# Patient Record
Sex: FEMALE | Race: WHITE | Employment: UNEMPLOYED | URBAN - METROPOLITAN AREA
[De-identification: names, ages, dates, MRNs, and addresses within clinical notes are randomized per-mention and may not be internally consistent; named-entity substitution may affect disease eponyms.]

---

## 2017-01-06 ENCOUNTER — HISTORICAL (OUTPATIENT)
Dept: ADMINISTRATIVE | Facility: HOSPITAL | Age: 27
End: 2017-01-06

## 2017-05-08 ENCOUNTER — HISTORICAL (OUTPATIENT)
Dept: ADMINISTRATIVE | Facility: HOSPITAL | Age: 27
End: 2017-05-08

## 2017-05-08 LAB
ALBUMIN SERPL-MCNC: 4.2 GM/DL (ref 3.4–5)
ALBUMIN/GLOB SERPL: 1 RATIO (ref 1–2)
ALP SERPL-CCNC: 61 UNIT/L (ref 20–120)
ALT SERPL-CCNC: 108 UNIT/L
AST SERPL-CCNC: 62 UNIT/L
BILIRUB SERPL-MCNC: 0.4 MG/DL
BILIRUBIN DIRECT+TOT PNL SERPL-MCNC: <0.1 MG/DL
BILIRUBIN DIRECT+TOT PNL SERPL-MCNC: >0.3 MG/DL
BUN SERPL-MCNC: 7 MG/DL (ref 7–25)
CALCIUM SERPL-MCNC: 9.5 MG/DL (ref 8.4–10.3)
CHLORIDE SERPL-SCNC: 104 MMOL/L (ref 96–110)
CO2 SERPL-SCNC: 27 MMOL/L (ref 24–32)
CREAT SERPL-MCNC: 0.75 MG/DL (ref 0.7–1.1)
DEPRECATED CALCIDIOL+CALCIFEROL SERPL-MC: 25.4 NG/ML (ref 30–80)
GLOBULIN SER-MCNC: 3.4 GM/ML (ref 2.3–3.5)
GLUCOSE SERPL-MCNC: 99 MG/DL (ref 65–99)
POTASSIUM SERPL-SCNC: 4.2 MMOL/L (ref 3.6–5.2)
PROT SERPL-MCNC: 7.6 GM/DL (ref 6–8)
SODIUM SERPL-SCNC: 139 MMOL/L (ref 135–146)

## 2017-11-08 ENCOUNTER — HISTORICAL (OUTPATIENT)
Dept: INTERNAL MEDICINE | Facility: CLINIC | Age: 27
End: 2017-11-08

## 2017-11-08 LAB
ABS NEUT (OLG): 7.2 X10(3)/MCL (ref 2.1–9.2)
ALBUMIN SERPL-MCNC: 4.2 GM/DL (ref 3.4–5)
ALBUMIN/GLOB SERPL: 1 RATIO (ref 1–2)
ALP SERPL-CCNC: 80 UNIT/L (ref 45–117)
ALT SERPL-CCNC: 84 UNIT/L (ref 12–78)
APPEARANCE, UA: CLEAR
AST SERPL-CCNC: 48 UNIT/L (ref 15–37)
BACTERIA #/AREA URNS AUTO: ABNORMAL /[HPF]
BASOPHILS # BLD AUTO: 0.09 X10(3)/MCL
BASOPHILS NFR BLD AUTO: 1 % (ref 0–1)
BILIRUB SERPL-MCNC: 0.6 MG/DL (ref 0.2–1)
BILIRUB UR QL STRIP: NEGATIVE
BILIRUBIN DIRECT+TOT PNL SERPL-MCNC: 0.2 MG/DL
BILIRUBIN DIRECT+TOT PNL SERPL-MCNC: 0.4 MG/DL
BUN SERPL-MCNC: 9 MG/DL (ref 7–18)
CALCIUM SERPL-MCNC: 9.9 MG/DL (ref 8.5–10.1)
CHLORIDE SERPL-SCNC: 103 MMOL/L (ref 98–107)
CO2 SERPL-SCNC: 29 MMOL/L (ref 21–32)
COLOR UR: ABNORMAL
CREAT SERPL-MCNC: 0.9 MG/DL (ref 0.6–1.3)
DEPRECATED CALCIDIOL+CALCIFEROL SERPL-MC: 28.63 NG/ML (ref 30–80)
EOSINOPHIL # BLD AUTO: 0.33 10*3/UL
EOSINOPHIL NFR BLD AUTO: 3 % (ref 0–5)
ERYTHROCYTE [DISTWIDTH] IN BLOOD BY AUTOMATED COUNT: 11.9 % (ref 11.5–14.5)
GLOBULIN SER-MCNC: 4.2 GM/ML (ref 2.3–3.5)
GLUCOSE (UA): NORMAL
GLUCOSE SERPL-MCNC: 84 MG/DL (ref 74–106)
HAV IGM SERPL QL IA: NONREACTIVE
HBV CORE IGM SERPL QL IA: NONREACTIVE
HBV SURFACE AG SERPL QL IA: NEGATIVE
HCT VFR BLD AUTO: 41.2 % (ref 35–46)
HCV AB SERPL QL IA: NONREACTIVE
HGB BLD-MCNC: 13.7 GM/DL (ref 12–16)
HGB UR QL STRIP: NEGATIVE
HIV 1+2 AB+HIV1 P24 AG SERPL QL IA: NONREACTIVE
HYALINE CASTS #/AREA URNS LPF: ABNORMAL /[LPF]
IMM GRANULOCYTES # BLD AUTO: 0.05 10*3/UL
IMM GRANULOCYTES NFR BLD AUTO: 0 %
KETONES UR QL STRIP: NEGATIVE
LEUKOCYTE ESTERASE UR QL STRIP: NEGATIVE
LYMPHOCYTES # BLD AUTO: 2.75 X10(3)/MCL
LYMPHOCYTES NFR BLD AUTO: 25 % (ref 15–40)
MCH RBC QN AUTO: 30.4 PG (ref 26–34)
MCHC RBC AUTO-ENTMCNC: 33.3 GM/DL (ref 31–37)
MCV RBC AUTO: 91.6 FL (ref 80–100)
MONOCYTES # BLD AUTO: 0.76 X10(3)/MCL
MONOCYTES NFR BLD AUTO: 7 % (ref 4–12)
NEUTROPHILS # BLD AUTO: 7.2 X10(3)/MCL
NEUTROPHILS NFR BLD AUTO: 64 X10(3)/MCL
NITRITE UR QL STRIP: NEGATIVE
PH UR STRIP: 5.5 [PH] (ref 4.5–8)
PLATELET # BLD AUTO: 309 X10(3)/MCL (ref 130–400)
PMV BLD AUTO: 9.4 FL (ref 7.4–10.4)
POTASSIUM SERPL-SCNC: 4.3 MMOL/L (ref 3.5–5.1)
PROT SERPL-MCNC: 8.4 GM/DL (ref 6.4–8.2)
PROT UR QL STRIP: NEGATIVE
RBC # BLD AUTO: 4.5 X10(6)/MCL (ref 4–5.2)
RBC #/AREA URNS AUTO: ABNORMAL /[HPF]
SODIUM SERPL-SCNC: 139 MMOL/L (ref 136–145)
SP GR UR STRIP: 1 (ref 1–1.03)
SQUAMOUS #/AREA URNS LPF: ABNORMAL /[LPF]
T4 FREE SERPL-MCNC: 1.07 NG/DL (ref 0.76–1.46)
TSH SERPL-ACNC: 1.09 MIU/L (ref 0.36–3.74)
UROBILINOGEN UR STRIP-ACNC: NORMAL
WBC # SPEC AUTO: 11.2 X10(3)/MCL (ref 4.5–11)
WBC #/AREA URNS AUTO: ABNORMAL /HPF

## 2018-05-09 ENCOUNTER — HISTORICAL (OUTPATIENT)
Dept: INTERNAL MEDICINE | Facility: CLINIC | Age: 28
End: 2018-05-09

## 2018-05-09 LAB
DEPRECATED CALCIDIOL+CALCIFEROL SERPL-MC: 24.52 NG/ML (ref 30–80)
FERRITIN SERPL-MCNC: 137.8 NG/ML (ref 10–150)
IRON SATN MFR SERPL: 31.5 % (ref 15–50)
IRON SERPL-MCNC: 104 MCG/DL (ref 50–170)
TIBC SERPL-MCNC: 330 MCG/DL (ref 250–450)
TRANSFERRIN SERPL-MCNC: 290 MG/DL (ref 200–360)

## 2018-07-09 ENCOUNTER — HISTORICAL (OUTPATIENT)
Dept: INTERNAL MEDICINE | Facility: CLINIC | Age: 28
End: 2018-07-09

## 2018-08-09 ENCOUNTER — HISTORICAL (OUTPATIENT)
Dept: ADMINISTRATIVE | Facility: HOSPITAL | Age: 28
End: 2018-08-09

## 2018-08-09 LAB
ABS NEUT (OLG): 4.45 X10(3)/MCL (ref 2.1–9.2)
ALBUMIN SERPL-MCNC: 4 GM/DL (ref 3.4–5)
ALBUMIN/GLOB SERPL: 1 RATIO (ref 1–2)
ALP SERPL-CCNC: 75 UNIT/L (ref 45–117)
ALT SERPL-CCNC: 122 UNIT/L (ref 12–78)
AST SERPL-CCNC: 62 UNIT/L (ref 15–37)
BASOPHILS # BLD AUTO: 0.07 X10(3)/MCL
BASOPHILS NFR BLD AUTO: 1 %
BILIRUB SERPL-MCNC: 0.4 MG/DL (ref 0.2–1)
BILIRUBIN DIRECT+TOT PNL SERPL-MCNC: 0.2 MG/DL
BILIRUBIN DIRECT+TOT PNL SERPL-MCNC: 0.2 MG/DL
BUN SERPL-MCNC: 9 MG/DL (ref 7–18)
CALCIUM SERPL-MCNC: 9.2 MG/DL (ref 8.5–10.1)
CHLORIDE SERPL-SCNC: 106 MMOL/L (ref 98–107)
CO2 SERPL-SCNC: 26 MMOL/L (ref 21–32)
CREAT SERPL-MCNC: 0.8 MG/DL (ref 0.6–1.3)
DEPRECATED CALCIDIOL+CALCIFEROL SERPL-MC: 21.25 NG/ML (ref 30–80)
EOSINOPHIL # BLD AUTO: 0.26 X10(3)/MCL
EOSINOPHIL NFR BLD AUTO: 3 %
ERYTHROCYTE [DISTWIDTH] IN BLOOD BY AUTOMATED COUNT: 12.1 % (ref 11.5–14.5)
EST. AVERAGE GLUCOSE BLD GHB EST-MCNC: 94 MG/DL
GLOBULIN SER-MCNC: 4.1 GM/ML (ref 2.3–3.5)
GLUCOSE SERPL-MCNC: 81 MG/DL (ref 74–106)
HBA1C MFR BLD: 4.9 % (ref 4.2–6.3)
HCT VFR BLD AUTO: 40.1 % (ref 35–46)
HGB BLD-MCNC: 13.3 GM/DL (ref 12–16)
IMM GRANULOCYTES # BLD AUTO: 0.03 10*3/UL
IMM GRANULOCYTES NFR BLD AUTO: 0 %
LYMPHOCYTES # BLD AUTO: 2.6 X10(3)/MCL
LYMPHOCYTES NFR BLD AUTO: 32 % (ref 13–40)
MCH RBC QN AUTO: 31.4 PG (ref 26–34)
MCHC RBC AUTO-ENTMCNC: 33.2 GM/DL (ref 31–37)
MCV RBC AUTO: 94.6 FL (ref 80–100)
MONOCYTES # BLD AUTO: 0.63 X10(3)/MCL
MONOCYTES NFR BLD AUTO: 8 % (ref 4–12)
NEUTROPHILS # BLD AUTO: 4.45 X10(3)/MCL
NEUTROPHILS NFR BLD AUTO: 55 X10(3)/MCL
PLATELET # BLD AUTO: 347 X10(3)/MCL (ref 130–400)
PMV BLD AUTO: 9.5 FL (ref 7.4–10.4)
POTASSIUM SERPL-SCNC: 4.4 MMOL/L (ref 3.5–5.1)
PROT SERPL-MCNC: 8.1 GM/DL (ref 6.4–8.2)
RBC # BLD AUTO: 4.24 X10(6)/MCL (ref 4–5.2)
SODIUM SERPL-SCNC: 139 MMOL/L (ref 136–145)
WBC # SPEC AUTO: 8 X10(3)/MCL (ref 4.5–11)

## 2018-08-22 ENCOUNTER — HISTORICAL (OUTPATIENT)
Dept: RADIOLOGY | Facility: HOSPITAL | Age: 28
End: 2018-08-22

## 2018-12-14 ENCOUNTER — HISTORICAL (OUTPATIENT)
Dept: INTERNAL MEDICINE | Facility: CLINIC | Age: 28
End: 2018-12-14

## 2018-12-14 LAB
ABS NEUT (OLG): 4.91 X10(3)/MCL (ref 2.1–9.2)
ALBUMIN SERPL-MCNC: 4.2 GM/DL (ref 3.4–5)
ALBUMIN/GLOB SERPL: 1 RATIO (ref 1–2)
ALP SERPL-CCNC: 69 UNIT/L (ref 45–117)
ALT SERPL-CCNC: 97 UNIT/L (ref 12–78)
AST SERPL-CCNC: 45 UNIT/L (ref 15–37)
BASOPHILS # BLD AUTO: 0.06 X10(3)/MCL
BASOPHILS NFR BLD AUTO: 1 %
BILIRUB SERPL-MCNC: 0.5 MG/DL (ref 0.2–1)
BILIRUBIN DIRECT+TOT PNL SERPL-MCNC: 0.2 MG/DL
BILIRUBIN DIRECT+TOT PNL SERPL-MCNC: 0.3 MG/DL
BUN SERPL-MCNC: 12 MG/DL (ref 7–18)
CALCIUM SERPL-MCNC: 9.3 MG/DL (ref 8.5–10.1)
CHLORIDE SERPL-SCNC: 105 MMOL/L (ref 98–107)
CO2 SERPL-SCNC: 27 MMOL/L (ref 21–32)
CREAT SERPL-MCNC: 0.8 MG/DL (ref 0.6–1.3)
DEPRECATED CALCIDIOL+CALCIFEROL SERPL-MC: 20.97 NG/ML (ref 30–80)
EOSINOPHIL # BLD AUTO: 0.14 X10(3)/MCL
EOSINOPHIL NFR BLD AUTO: 2 %
ERYTHROCYTE [DISTWIDTH] IN BLOOD BY AUTOMATED COUNT: 11.6 % (ref 11.5–14.5)
GLOBULIN SER-MCNC: 3.9 GM/ML (ref 2.3–3.5)
GLUCOSE SERPL-MCNC: 91 MG/DL (ref 74–106)
HCT VFR BLD AUTO: 36.7 % (ref 35–46)
HGB BLD-MCNC: 12.4 GM/DL (ref 12–16)
IMM GRANULOCYTES # BLD AUTO: 0.03 10*3/UL
IMM GRANULOCYTES NFR BLD AUTO: 0 %
LYMPHOCYTES # BLD AUTO: 2.41 X10(3)/MCL
LYMPHOCYTES NFR BLD AUTO: 30 % (ref 13–40)
MCH RBC QN AUTO: 31.9 PG (ref 26–34)
MCHC RBC AUTO-ENTMCNC: 33.8 GM/DL (ref 31–37)
MCV RBC AUTO: 94.3 FL (ref 80–100)
MONOCYTES # BLD AUTO: 0.59 X10(3)/MCL
MONOCYTES NFR BLD AUTO: 7 % (ref 4–12)
NEUTROPHILS # BLD AUTO: 4.91 X10(3)/MCL
NEUTROPHILS NFR BLD AUTO: 60 X10(3)/MCL
PLATELET # BLD AUTO: 311 X10(3)/MCL (ref 130–400)
PMV BLD AUTO: 8.8 FL (ref 7.4–10.4)
POTASSIUM SERPL-SCNC: 4.1 MMOL/L (ref 3.5–5.1)
PROT SERPL-MCNC: 8.1 GM/DL (ref 6.4–8.2)
RBC # BLD AUTO: 3.89 X10(6)/MCL (ref 4–5.2)
SODIUM SERPL-SCNC: 139 MMOL/L (ref 136–145)
WBC # SPEC AUTO: 8.1 X10(3)/MCL (ref 4.5–11)

## 2019-03-19 ENCOUNTER — HISTORICAL (OUTPATIENT)
Dept: INTERNAL MEDICINE | Facility: CLINIC | Age: 29
End: 2019-03-19

## 2019-03-19 LAB
ABS NEUT (OLG): 4.06 X10(3)/MCL (ref 2.1–9.2)
ALBUMIN SERPL-MCNC: 3.6 GM/DL (ref 3.4–5)
ALBUMIN/GLOB SERPL: 0.8 RATIO (ref 1.1–2)
ALP SERPL-CCNC: 82 UNIT/L (ref 45–117)
ALT SERPL-CCNC: 126 UNIT/L (ref 12–78)
AST SERPL-CCNC: 81 UNIT/L (ref 15–37)
BASOPHILS # BLD AUTO: 0.06 X10(3)/MCL
BASOPHILS NFR BLD AUTO: 1 %
BILIRUB SERPL-MCNC: 0.5 MG/DL (ref 0.2–1)
BILIRUBIN DIRECT+TOT PNL SERPL-MCNC: 0.2 MG/DL
BILIRUBIN DIRECT+TOT PNL SERPL-MCNC: 0.3 MG/DL
BUN SERPL-MCNC: 9 MG/DL (ref 7–18)
CALCIUM SERPL-MCNC: 9.2 MG/DL (ref 8.5–10.1)
CHLORIDE SERPL-SCNC: 106 MMOL/L (ref 98–107)
CO2 SERPL-SCNC: 28 MMOL/L (ref 21–32)
CREAT SERPL-MCNC: 0.8 MG/DL (ref 0.6–1.3)
DEPRECATED CALCIDIOL+CALCIFEROL SERPL-MC: 16.74 NG/ML (ref 30–80)
EOSINOPHIL # BLD AUTO: 0.3 10*3/UL
EOSINOPHIL NFR BLD AUTO: 4 %
ERYTHROCYTE [DISTWIDTH] IN BLOOD BY AUTOMATED COUNT: 11.8 % (ref 11.5–14.5)
GLOBULIN SER-MCNC: 4.3 GM/ML (ref 2.3–3.5)
GLUCOSE SERPL-MCNC: 121 MG/DL (ref 74–106)
HCT VFR BLD AUTO: 40.9 % (ref 35–46)
HGB BLD-MCNC: 13.4 GM/DL (ref 12–16)
IMM GRANULOCYTES # BLD AUTO: 0.02 10*3/UL
IMM GRANULOCYTES NFR BLD AUTO: 0 %
LYMPHOCYTES # BLD AUTO: 1.78 X10(3)/MCL
LYMPHOCYTES NFR BLD AUTO: 27 % (ref 13–40)
MCH RBC QN AUTO: 30.3 PG (ref 26–34)
MCHC RBC AUTO-ENTMCNC: 32.8 GM/DL (ref 31–37)
MCV RBC AUTO: 92.5 FL (ref 80–100)
MONOCYTES # BLD AUTO: 0.43 X10(3)/MCL
MONOCYTES NFR BLD AUTO: 6 % (ref 4–12)
NEUTROPHILS # BLD AUTO: 4.06 X10(3)/MCL
NEUTROPHILS NFR BLD AUTO: 61 X10(3)/MCL
PLATELET # BLD AUTO: 324 X10(3)/MCL (ref 130–400)
PMV BLD AUTO: 9.1 FL (ref 7.4–10.4)
POTASSIUM SERPL-SCNC: 3.9 MMOL/L (ref 3.5–5.1)
PROT SERPL-MCNC: 7.9 GM/DL (ref 6.4–8.2)
RBC # BLD AUTO: 4.42 X10(6)/MCL (ref 4–5.2)
SODIUM SERPL-SCNC: 140 MMOL/L (ref 136–145)
WBC # SPEC AUTO: 6.6 X10(3)/MCL (ref 4.5–11)

## 2021-09-22 ENCOUNTER — HISTORICAL (OUTPATIENT)
Dept: ADMINISTRATIVE | Facility: HOSPITAL | Age: 31
End: 2021-09-22

## 2021-09-22 LAB
BILIRUB SERPL-MCNC: NEGATIVE MG/DL
BLOOD URINE, POC: NEGATIVE
CLARITY, POC UA: CLEAR
COLOR, POC UA: YELLOW
GLUCOSE UR QL STRIP: NEGATIVE
KETONES UR QL STRIP: NEGATIVE
LEUKOCYTE EST, POC UA: NEGATIVE
NITRITE, POC UA: NEGATIVE
PH, POC UA: 7
PROTEIN, POC: NEGATIVE
SPECIFIC GRAVITY, POC UA: 1.01
UROBILINOGEN, POC UA: NORMAL

## 2022-04-10 ENCOUNTER — HISTORICAL (OUTPATIENT)
Dept: ADMINISTRATIVE | Facility: HOSPITAL | Age: 32
End: 2022-04-10

## 2022-04-29 VITALS
HEIGHT: 61 IN | SYSTOLIC BLOOD PRESSURE: 127 MMHG | DIASTOLIC BLOOD PRESSURE: 84 MMHG | WEIGHT: 215.81 LBS | HEIGHT: 61 IN | BODY MASS INDEX: 40.75 KG/M2 | DIASTOLIC BLOOD PRESSURE: 86 MMHG | OXYGEN SATURATION: 99 % | BODY MASS INDEX: 39.41 KG/M2 | OXYGEN SATURATION: 98 % | SYSTOLIC BLOOD PRESSURE: 123 MMHG | WEIGHT: 208.75 LBS

## 2022-05-02 NOTE — HISTORICAL [facility] C[facility]ER
This is a historical note converted from [place]. Formatting and pictures may have been removed.  Please reference C[facility]ain for original formatting and attached multimedia. Chief Complaint  vomitting since yesterday  History of Present Illness  Patient presents to Urgent Care for stated complaint during Covid health crisis.  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 States she will location?in 2013?and 2 .??Took Tylenol?to 500 mg this morning around 4:00am?today?and did help with the pain?.denies any blood in the urine, denies any recent fall or trauma?denies any chest pain or short of breath  ?  Review of Systems  Constitutional: negative except as stated in HPI  Eye: negative except as stated in HPI  ENT: negative except as stated in HPI  Respiratory: negative except as stated in HPI  Cardiovascular: negative except as stated in HPI  Gastrointestinal: negative except as stated in HPI  Genitourinary: negative except as stated in HPI  Hema/Lymph: negative except as stated in HPI  Endocrine: negative except as stated in HPI  Immunologic: negative except as stated in HPI  Musculoskeletal: negative except as stated in HPI  Integumentary: negative except as stated in HPI  Neurologic: negative except as stated in HPI  Physical Exam  Vitals & Measurements  HR:?85(Peripheral)? BP:?127/86? SpO2:?98%?  HT:?154.00?cm? WT:?94.700?kg? BMI:?39.93?  General: well-developed well-nourished in no acute distress  Eye: PERRL, EOMI, clear conjunctiva, eyelids normal  Neck: full range of motion  Respiratory: clear to auscultation bilaterally  Cardiovascular: regular rate and rhythm without murmurs,?radial pulses 2+, cap refill less than 2 seconds  Gastrointestinal: soft, non-tender,  non-distended  Musculoskeletal: SOTO, ambulatory,?no CVA tenderness,?spinal vertebral tenderness?at lumbar?region?nonradiating?no paraspinal?muscle skeletal?tenderness with palpation  Integumentary: no rashes or skin lesions visible  Neurologic: cranial nerves grossly intact, no signs of peripheral neurological deficit  Assessment/Plan  1.?Nausea & vomiting?R11.2  Discussed physical exam findings and test results with patient x-ray showed loss of height?of L5?and S1,?urine negative?for hematuria?or leukocyte?or nitrite, discussed medication prescribed and its use?Rx?Zofran 8 mg ODT?take as directed, diclofenac 50 mg 3 times daily take as directed,?Toradol?60 mg IM?and Zofran 4 mg ODT in the clinic.  Instructed patient to notify his primary care provider regarding the visit today and schedule follow-up appointment in 2 to 3 days if needed  Instructed patient to come back to clinic or go to emergency room department if symptom worsens or no improvement or for any other reasons  Questions elicited and answered  Patient verbalized understanding of discharge instructions, verbalized understand medication prescribed and its use, will read discharge education instructions  Ordered:  ondansetron, 8 mg = 1 tab(s), Oral, TID, PRN PRN nausea/vomiting, allow tablet to dissolve on tongue, # 12 tab(s), 0 Refill(s), Pharmacy: [facility] #[id number], Patient Education Provided, Patient Verbalizes Understanding, 154, cm, Height/Length Dosing, 09/2...  Office/Outpatient Visit Level 4 Established 36067 , Nausea & vomiting  Back pain, 09/22/21 12:15:00 CDT  ?  2.?Back pain?M54.9  ?As discussed in #1, follow-up with PCP.  Ordered:  diclofenac, 50 mg = 1 tab(s), Oral, TID, PRN PRN as needed for pain, with food, do not mix with any other NSAIDs, # 21 tab(s), 0 Refill(s), Pharmacy: [facility] #[id number], Patient Education Provided, Patient Verbalizes Understanding, 154, cm, Height/Length...  ondansetron, 8 mg = 1 tab(s), Oral,  TID, PRN PRN nausea/vomiting, allow tablet to dissolve on tongue, # 12 tab(s), 0 Refill(s), [id number], Patient Education Provided, Patient Verbalizes Understanding, 154, cm, Height/Length Dosing, ...  Office/Outpatient Visit Level 4 Established [id number] PC, Nausea & vomiting  Back pain, 21 12:15:00 CDT  ?   Problem List/Past Medical History  Ongoing  Depression  Depression  Fatty liver  Hyperlipidemia  Obesity  Sinusitis  Tachycardia  Historical  No qualifying data  Procedure/Surgical History  Fracture dislocation of finger (2017)   delivery  tubal ligation   Medications  diclofenac sodium 50 mg oral delayed release tablet, 50 mg= 1 tab(s), Oral, TID, PRN  Imitrex 25 mg oral tablet, 25 mg= 1 tab(s), Oral, Daily, PRN, 1 refills,? ?Not taking  Template Non-Formulary Med  Tylenol with Codeine #3 oral tablet, 1 tab(s), Oral, q6hr, PRN,? ?Not Taking, Completed Rx  Vitamin C 25 mg oral tablet, chewable, 25 mg= 1 tab(s), Chewed, Daily  Vitamin D3 2000 intl units oral capsule, 2000 IntUnit= 1 cap(s), Oral, Daily, 2 refills  Zofran ODT 8 mg oral tablet, disintegrating, 8 mg= 1 tab(s), Oral, TID, PRN  Allergies  Alcohol?(Hives, itching)  Social History  Abuse/Neglect  No, No, Yes, 2021  Alcohol  Current, 1-2 times per week, 2020  Employment/School  Unemployed, 2016  Exercise  Exercise duration: 60. Exercise frequency: 3-4 times/week. Self assessment: Fair condition. Exercise type: Walking., 10/29/2018  Home/Environment  Lives with Children, Mother, Spouse. Living situation: Home/Independent. Home equipment: Walker/Cane. Alcohol abuse in household: No. Substance abuse in household: No. Smoker in household: No. Injuries/Abuse/Neglect in household: No. Feels unsafe at home: No. Safe place to go: Yes. Agency(s)/Others notified: No. Family/Friends available for support: Yes. Concern for family members at home: No. Major illness in household: No. Financial  concerns: Yes. TV/Computer concerns: No. Risks in environment: Pets/Animal exposure., 09/09/2016  Nutrition/Health  Type of diet: low sodium., 10/29/2018  Sexual  Substance Use  Past, Marijuana, 06/07/2017  Tobacco  Former smoker, quit more than 30 days ago, No, 09/22/2021  Family History  Cancer: Father.  Depression.....: Father.  Hypertension.: Father.  Health Maintenance  Health Maintenance  ???Pending?(in the next year)  ??? ??OverDue  ??? ? ? ?Cervical Cancer Screening due??07/17/20??and every 3??year(s)  ??? ? ? ?Alcohol Misuse Screening due??01/02/21??and every 1??year(s)  ??? ? ? ?Tetanus Vaccine due??04/27/21??and every 10??year(s)  ??? ? ? ?ADL Screening due??06/29/21??and every 1??year(s)  ??? ??Due In Future?  ??? ? ? ?Obesity Screening not due until??01/01/22??and every 1??year(s)  ??? ? ? ?Diabetes Screening not due until??03/18/22??and every 3??year(s)  ???Satisfied?(in the past 1 year)  ??? ??Satisfied?  ??? ? ? ?Blood Pressure Screening on??09/22/21.??Satisfied by Brasseaarjun BOYD, Landy D.  ??? ? ? ?Body Mass Index Check on??09/22/21.??Satisfied by Brasseaux SUMITN, Landy D.  ??? ? ? ?Depression Screening on??09/22/21.??Satisfied by Brasseaux SUMITN, Landy D.  ??? ? ? ?Hypertension Management-Blood Pressure on??09/22/21.??Satisfied by Brasseaux SUMITN, Landy D.  ??? ? ? ?Influenza Vaccine on??09/22/21.??Satisfied by Brasseaux LPN, Alndy D.  ??? ? ? ?Obesity Screening on??09/22/21.??Satisfied by Brasseaux SUMITN, Landy D.  ?  Lab Results  Urinalysis????????????  Color Ur (Ref range=Yellow)????????Yellow????  Appear Ur (Ref range=Clear)????????Clear????  pH Ur (Ref range=5-9)????????7????  Spec Grav Ur (Ref range=1.000-1.020)????????1.015????  Blood Ur (Ref range=Negaive)????????Negative????  Glucose Ur (Ref range=Negative)????????Negative????  Ketones Ur (Ref range=Negative)????????Negative????  Protein Ur (Ref range=Negative)????????Negative????  Bilirubin Ur (Ref  range=Negative)????????Negative????  Urobilinogen (Ref range=0.1-2 BU/)????????0.2 mg/dl????  Leukocytes Ur (Ref range=Negative)????????Negative????  Nitrite Ur (Ref range=Negative)????????Negative????  ?  Diagnostic Results  (09/22/2021 11:49 CDT XR Spine Lumbar 2 or 3 Views)  ?  Reason For Exam  pain  ?  Radiology Report  Lumbar spine 3 views  ?  Clinical history is pain  ?  There are degenerative changes at L5-S1  ?  No fractures are seen the alignment is within normal limits. The other  intervertebral disc space are maintained.  ?  IMPRESSION: Loss of height of the disc space at L5-S1.  ?  Signature Line  Electronically Signed By: Deejay Quiles MD  Date/Time Signed: 09/22/2021 11:56 [1]     [1]?XR Spine Lumbar 2 or 3 Views; Deejay Quiles MD 09/22/2021 11:49 CDT

## 2022-05-02 NOTE — HISTORICAL [facility] C[facility]
This is a historical note converted from [facility]. Formatting and pictures may have been removed.  Please reference [facility] for original formatting and attached multimedia. Chief Complaint  annual  History of Present Illness  Pt is  here for annual gyn exam. Denies hx of abnormal pap. Last pap 18. Periods last 5-6 days and changes pads every 2-3 hours. States periods have always been irregular and has period every 1-3 months. Denies heavy bleeding or vaginal discharge. Pt had TL for contraception.?Denies history of abnormal paps. Last pap 2017-NIL. Denies breast or urinary complaints today. States 1 month ago she thinks she had odor when she would urinate states smelled like cherrios.?Denies any odor, frequency, urgency or dysuria today.?Pt has A1C ordered for today. Denies abnormal bleeding but c/o slight discharge when asked during exam. Pt reports no STIs in the past and no concerns. Contraception consist of TL. Denies tobacco and ETOH use. Dep. screening 0. Denies fly hx of ovarian, uterine cancer. Admits to maternal cousin with breast cancer and colon cancer in maternal great uncle.  ?  Review of Systems  CONSTITUTIONAL:??No weight loss, fever, chills, weakness or fatigue.  BREAST: No lumps or masses or pain, no nipple discharge or inversion  GI:??No nausea, vomiting or?abdominal pain.  :??No dysuria, frequency or urgency.?  GYN: No abnormal discharge, itching, bleeding, pelvic pain.  NEUROLOGIC:??No dizziness or confusion.  Physical Exam  Vitals & Measurements  T:?36.7? ?C (Oral)? HR:?87(Peripheral)? RR:?18? BP:?105/72?  HT:?152?cm? HT:?152?cm? WT:?95.1?kg? WT:?95.1?kg? BMI:?41.16?  GENERAL: Alert and oriented x3.?No apparent distress.  BREAST: No mass, tenderness or discharge.?  ABDOMEN: Soft & obese, nontender.??  PELVIC:?  Labia: No erythema or lesions.  Vagina: pink,?white discharge.  Cervix: Pink, no cervical motion tenderness, no lesions.  Uterus: Mobile, non-tender.  Adnexa: Non-tender, no  fullness.  INTEGUMENTARY:?Warm and dry.  NEUROLOGIC: She is alert and oriented x3.?  PSYCHIATRIC:?Cooperative, appropriate mood and affect.  Assessment/Plan  1.?Encounter for routine gynecological examination  ? Pelvic exam today. Pap utd per ACOG.  RTC 1 year.  Ordered:  Clinic Follow up, *Est. 19 3:00:00 CDT, 1 Year, Order for future visit, Encounter for routine gynecological examination, [facility]  Preventative Health Care Est 18-39 years [id number] PC, Encounter for routine gynecological examination, [facility], 18 8:46:00 CDT  ?  2.?Pelvic pain  ? c/o?ovulatory pain/cramping.?Take Tylenol and relieves.?Pt with elevated liver enzymes. Instructed to use Ibuprofen for pain as?needed.  Pelvic uls.  Ordered:  US Pelvic Non-Obstetrics, Routine, 18 8:46:00 CDT, Pelvic Pain, None, Ambulatory, Rad Type, Order for future visit, Pelvic pain, Schedule this test, [facility], 18 8:46:00 CDT  US Transvaginal Non-OB, Routine, 18 8:46:00 CDT, Pelvic Pain, None, Ambulatory, Rad Type, Order for future visit, Pelvic pain, Schedule this test, [facility], 18 8:46:00 CDT  ?  3.?Vaginal discharge  ? wet prep  declined g/c  Ordered:  Wet Prep Smear, Routine collect, Vaginal, Order for future visit, 18 8:46:00 CDT, Stop date 18 8:46:00 CDT, Nurse collect, Vaginal discharge, 18 8:46:00 CDT  ?   Problem List/Past Medical History  Ongoing  Depression  Depression  Fatty liver  Hyperlipidemia  Obesity  Sinusitis  Tachycardia  Historical  No qualifying data  Procedure/Surgical History  Fracture dislocation of finger (2017)   delivery  tubal ligation   Medications  Flonase 50 mcg/inh nasal spray, 1 spray(s), Nasal, Daily  folic acid 1 mg oral tablet, 1 mg= 1 tab(s), Oral, Daily  meloxicam 7.5 mg oral tablet, 7.5 mg= 1 tab(s), Oral, BID  pravastatin 10 mg oral tablet, 10 mg= 1 tab(s), Oral, Daily, 3 refills  sertraline 100 mg  oral tablet, 100 mg= 1 tab(s), Oral, Daily, 3 refills  Template Non-Formulary Med  Vitamin D3 2000 intl units oral capsule, 2000 IntUnit= 1 cap(s), Oral, Daily, 2 refills  Allergies  Alcohol?(itching)  Social History  Alcohol  Current, Beer, Wine, Liquor, 1-2 times per month, 06/07/2017  Employment/School  Unemployed, 09/09/2016  Home/Environment  Lives with Children, Mother, Spouse. Living situation: Home/Independent. Home equipment: Walker/Cane. Alcohol abuse in household: No. Substance abuse in household: No. Smoker in household: No. Injuries/Abuse/Neglect in household: No. Feels unsafe at home: No. Safe place to go: Yes. Agency(s)/Others notified: No. Family/Friends available for support: Yes. Concern for family members at home: No. Major illness in household: No. Financial concerns: Yes. TV/Computer concerns: No. Risks in environment: Pets/Animal exposure., 09/09/2016  Sexual  Substance Abuse  Past, Marijuana, 06/07/2017  Tobacco  Former smoker Use:., 08/09/2018  Family History  Cancer: Father.  Depression 07-JUN-2017 17:52:07<$>: Father.  Hypertension.: Father.  Health Maintenance  Health Maintenance  ???Pending?(in the next year)  ??? ??Due?  ??? ? ? ?Influenza Vaccine due??08/09/18??and every?  ??? ??Due In Future?  ??? ? ? ?Alcohol Misuse Screening not due until??11/08/18??and every 1??year(s)  ??? ? ? ?Blood Pressure Screening not due until??05/08/19??and every 1??year(s)  ??? ? ? ?Body Mass Index Check not due until??05/08/19??and every 1??year(s)  ???Satisfied?(in the past 1 year)  ??? ??Satisfied?  ??? ? ? ?Alcohol Misuse Screening on??11/08/17.??Satisfied by Latia Daniel NP  ??? ? ? ?Blood Pressure Screening on??08/09/18.??Satisfied by Kenan Littlejohn LPN  ??? ? ? ?Body Mass Index Check on??08/09/18.??Satisfied by Kenan Littlejohn LPN  ??? ? ? ?Depression Screening on??08/09/18.??Satisfied by Kenan Littlejohn LPN  ??? ? ? ?Obesity Screening on??08/09/18.??Satisfied by Kenan Littlejohn LPN  ??? ?  ? ?Smoking Cessation (Coronary Artery Disease) on??11/26/17.??Satisfied by Fredy ALCALA, Edgar HUERTAS  ??? ??Refused?  ??? ? ? ?Influenza Vaccine on??11/08/17.??Recorded for María PITTMAN, Latia LAWSON??Reason: Patient Refuses  ?  ?     [1]?Office Visit Note; Marily PITTMAN, Danay Covarrubias 07/18/2017 10:38 CDT
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
## 2022-09-17 ENCOUNTER — HOSPITAL ENCOUNTER (EMERGENCY)
Facility: HOSPITAL | Age: 32
Discharge: HOME | End: 2022-09-17
Attending: INTERNAL MEDICINE

## 2022-09-17 VITALS
DIASTOLIC BLOOD PRESSURE: 86 MMHG | OXYGEN SATURATION: 96 % | RESPIRATION RATE: 12 BRPM | HEIGHT: 60 IN | HEART RATE: 97 BPM | TEMPERATURE: 99 F | SYSTOLIC BLOOD PRESSURE: 137 MMHG | WEIGHT: 215.19 LBS | BODY MASS INDEX: 42.25 KG/M2

## 2022-09-17 DIAGNOSIS — S05.02XD ABRASION OF LEFT CORNEA, SUBSEQUENT ENCOUNTER: Primary | ICD-10-CM

## 2022-09-17 PROCEDURE — 25000003 PHARM REV CODE 250

## 2022-09-17 PROCEDURE — 25000003 PHARM REV CODE 250: Performed by: INTERNAL MEDICINE

## 2022-09-17 PROCEDURE — 99284 EMERGENCY DEPT VISIT MOD MDM: CPT

## 2022-09-17 RX ORDER — HYDROCODONE BITARTRATE AND ACETAMINOPHEN 7.5; 325 MG/1; MG/1
1 TABLET ORAL ONCE
Status: COMPLETED | OUTPATIENT
Start: 2022-09-17 | End: 2022-09-17

## 2022-09-17 RX ORDER — CIPROFLOXACIN HYDROCHLORIDE 3 MG/ML
2 SOLUTION/ DROPS OPHTHALMIC EVERY 6 HOURS
Qty: 5 ML | Refills: 0 | Status: SHIPPED | OUTPATIENT
Start: 2022-09-17 | End: 2023-03-08

## 2022-09-17 RX ORDER — HYDROCODONE BITARTRATE AND ACETAMINOPHEN 5; 325 MG/1; MG/1
1 TABLET ORAL EVERY 4 HOURS PRN
Qty: 12 TABLET | Refills: 0 | Status: SHIPPED | OUTPATIENT
Start: 2022-09-17 | End: 2023-03-08

## 2022-09-17 RX ORDER — TETRACAINE HYDROCHLORIDE 5 MG/ML
2 SOLUTION OPHTHALMIC
Status: DISCONTINUED | OUTPATIENT
Start: 2022-09-17 | End: 2022-09-17 | Stop reason: HOSPADM

## 2022-09-17 RX ADMIN — HYDROCODONE BITARTRATE AND ACETAMINOPHEN 1 TABLET: 7.5; 325 TABLET ORAL at 11:09

## 2022-09-17 NOTE — ED PROVIDER NOTES
"Encounter Date: 9/17/2022       History     Chief Complaint   Patient presents with    Eye Pain     Pt reports to the c/o redness, swelling, and pain to left eye (secondary to getting plastic lodged in it) approximately 2 days ago. Also states nausea and "head is floating".Was initially seen at [facility]  and prescribed erythromycin. Anupama guzman     Ms. Sosa Ko is a 32 y.o. female who comes in today [facility]'s ED today for LT eye pain that developed after the use of erythromycin topical antibiotic use for 1 day. She states on 9/14/22 she was sitting on her couch when she randomly felt something go into her eye. She went to [facility] ED where the provider there removed a clear piece of hard plastic from her left eye, which took 45 minutes to do. She was given erythromycin topical antibiotic, which she used 4 times yesterday (as prescribed) when she began having this complaint with her eye.    The history is provided by the patient.   Review of patient's allergies indicates:  No Known Allergies  No past medical history on file.  No past surgical history on file.  No family history on file.     Review of Systems   Constitutional:  Negative for chills, diaphoresis and fever.   Eyes:  Positive for pain and redness.   Respiratory:  Negative for cough, chest tightness, shortness of breath and wheezing.    Cardiovascular:  Negative for chest pain, palpitations and leg swelling.   Skin:  Negative for pallor.   Neurological:  Negative for dizziness, syncope, weakness, light-headedness, numbness and headaches.   All other systems reviewed and are negative.    Physical Exam     Initial Vitals [09/17/22 0920]   BP Pulse Resp Temp SpO2   137/86 97 18 98.6 °F (37 °C) 96 %      MAP       --         Physical Exam    Nursing note and vitals reviewed.  Constitutional: She appears well-developed and well-nourished.   HENT:   Head: Normocephalic and atraumatic.   Eyes: EOM are normal. Pupils are equal, round, and reactive to light. "   Erythematous LT conjunctiva, vision 20-40 in LT eye, intraocular pressure measured at 25 mmHg, Wood's lamp fluorescein dye revealed 1mm corneal abrasion in the 5 o' clock position in LT eye   Neck: Neck supple. No thyromegaly present. No JVD present.   Normal range of motion.  Cardiovascular:  Normal rate, regular rhythm, normal heart sounds and intact distal pulses.           No murmur heard.  Pulmonary/Chest: Breath sounds normal. No respiratory distress. She has no wheezes. She has no rales.   Abdominal: Abdomen is soft. Bowel sounds are normal.   Musculoskeletal:         General: Normal range of motion.      Cervical back: Normal range of motion and neck supple.     Neurological: She is alert and oriented to person, place, and time. She has normal strength. GCS score is 15. GCS eye subscore is 4. GCS verbal subscore is 5. GCS motor subscore is 6.   Skin: Skin is warm. Capillary refill takes less than 2 seconds. No rash noted. No erythema.   Psychiatric: She has a normal mood and affect. Her behavior is normal. Judgment and thought content normal.       ED Course   Procedures  Labs Reviewed - No data to display       Imaging Results    None          Medications   HYDROcodone-acetaminophen 7.5-325 mg per tablet 1 tablet (1 tablet Oral Given 9/17/22 1124)                Attending Attestation:   Physician Attestation Statement for Resident:  As the supervising MD   Physician Attestation Statement: I have personally seen and examined this patient.   I agree with the above history.  -:   As the supervising MD I agree with the above PE.     As the supervising MD I agree with the above treatment, course, plan, and disposition.                Attending ED Notes:   I performed a face to face evaluation of the patient Sosa Ko and my history reveal left eye pain after FB removal at [facility] 2 days ago, is using erythromycin ointment QID the physical exam was remarkable for left injected sclerae  with photophobia, small abrasion at 5 OClock, unremarkable visual acuity and IOP.  I reviewed the history, physical exam and diagnostic studies performed by the resident Dr. Carlin and I concur with the diagnosis and assessment. This case was initially evaluated by Dr. Carlin  under my supervision and I agree with the documentation and plan.    Total teaching time: 15 min                     Clinical Impression:   Final diagnoses:  [S05.02XD] Abrasion of left cornea, subsequent encounter (Primary)        ED Disposition Condition    Discharge Stable          - Performed fluorescein lamp on LT, corneal abrasion 1mm in size located in the 5 o' clock position at 1030  - Discussed patient with Dr. East, recommendation for follow up in opthalmology clinic, d/c erythromycin, start new antibiotic drop and artifical tears at 1050  - Prescribed Ciprofloxacin q6hr, artifical tears q2hr, Norco 5 for eye pain  - Patient will be called for ophthalmology appt within next week      Alfonzo Zamora MD  [facility], HO-[facility]        ED Prescriptions       Medication Sig Dispense Start Date End Date Auth. Provider    ciprofloxacin HCl (CILOXAN) 0.3 % ophthalmic solution Place 2 drops into the left eye every 6 (six) hours. 5 mL 9/17/2022 -- Christopher Carlin MD    dextran 70-hypromellose (TEARS) ophthalmic solution Place 1 drop into the left eye every 2 (two) hours. 30 mL 9/17/2022 -- Christopher Carlin MD    HYDROcodone-acetaminophen (NORCO) 5-325 mg per tablet Take 1 tablet by mouth every 4 (four) hours as needed for Pain. 12 tablet 9/17/2022 -- Christopher Carlin MD          Follow-up Information       Follow up With Specialties Details Why Contact Info    [facility] Emergency Medicine Go to  If symptoms worsen [address] [facility] [address] [phone]    [facility] Ophthalmology Schedule an appointment as soon as possible for a visit in 1  week They will call with an appointment day. [address] [address]             Christopher Carlin MD  Resident  09/17/22 1112       Alfonzo Zamora MD  09/17/22 7073

## 2023-02-16 ENCOUNTER — TELEPHONE (OUTPATIENT)
Dept: FAMILY MEDICINE | Facility: CLINIC | Age: 33
End: 2023-02-16

## 2023-02-16 NOTE — TELEPHONE ENCOUNTER
----- Message from Jane Reilly sent at 2/16/2023  2:43 PM CST -----  Regarding: Appt  .Type:  Needs Medical Advice    Who Called: Pt  Symptoms (please be specific):    How long has patient had these symptoms:    Pharmacy name and phone #:    Would the patient rather a call back or a response via [facility]? Call back  Best Call Back Number: [phone]  Additional Information: Pt wants to schedule but has medicaid in profile, pt is a self pay and I am unable to schedule.

## 2023-02-16 NOTE — TELEPHONE ENCOUNTER
Spoke to pt. She states that she has not had medicaid in about 9 years. Please check into this and let pt know as she is wanting an appt.

## 2023-03-08 ENCOUNTER — OFFICE VISIT (OUTPATIENT)
Dept: FAMILY MEDICINE | Facility: CLINIC | Age: 33
End: 2023-03-08

## 2023-03-08 VITALS
TEMPERATURE: 99 F | BODY MASS INDEX: 37.72 KG/M2 | DIASTOLIC BLOOD PRESSURE: 85 MMHG | HEIGHT: 63 IN | OXYGEN SATURATION: 98 % | HEART RATE: 97 BPM | SYSTOLIC BLOOD PRESSURE: 127 MMHG | WEIGHT: 212.88 LBS | RESPIRATION RATE: 18 BRPM

## 2023-03-08 DIAGNOSIS — Z00.00 ANNUAL PHYSICAL EXAM: ICD-10-CM

## 2023-03-08 DIAGNOSIS — K76.0 HEPATIC STEATOSIS: Primary | ICD-10-CM

## 2023-03-08 DIAGNOSIS — Z13.220 NEED FOR LIPID SCREENING: ICD-10-CM

## 2023-03-08 DIAGNOSIS — Z13.39 ADHD (ATTENTION DEFICIT HYPERACTIVITY DISORDER) EVALUATION: ICD-10-CM

## 2023-03-08 PROBLEM — F90.9 ADHD: Status: ACTIVE | Noted: 2023-03-08

## 2023-03-08 PROCEDURE — 99385 PREV VISIT NEW AGE 18-39: CPT | Mod: ,,, | Performed by: STUDENT IN AN ORGANIZED HEALTH CARE EDUCATION/TRAINING PROGRAM

## 2023-03-08 PROCEDURE — 99385 PR PREVENTIVE VISIT,NEW,18-39: ICD-10-PCS | Mod: ,,, | Performed by: STUDENT IN AN ORGANIZED HEALTH CARE EDUCATION/TRAINING PROGRAM

## 2023-03-08 NOTE — ASSESSMENT & PLAN NOTE
- Abdominal U/S from 07/09/18 positive for mild hepatomegaly with underlying steatosis.  - Fatty Liver Disease Recommendations: low fat diet, daily exercise, weight loss, and alcohol avoidance.

## 2023-03-08 NOTE — ASSESSMENT & PLAN NOTE
- Patient following with Angelito Huitron for ADHD evaluation.  - Once we receive office visit with diagnosis/recommendations, patient will present for an office visit for ADHD management with us.

## 2023-03-08 NOTE — PROGRESS NOTES
"Subjective:      Patient ID: Sosa Ko is a 32 y.o.  female.    Chief Complaint: Establish Care    ADHD Evaluation: Patient following with Angelito Huitron.    FH: Father passed away with SCC.    Preventative Health: Patient denies any history of abnormal Pap smears.     Review of Systems   Constitutional:  Negative for activity change, fatigue and fever.   Eyes:  Negative for visual disturbance.   Respiratory:  Negative for apnea, cough and shortness of breath.    Cardiovascular:  Negative for chest pain and leg swelling.   Gastrointestinal:  Negative for abdominal pain, diarrhea and nausea.   Genitourinary:  Negative for dysuria and hematuria.   Musculoskeletal:  Negative for back pain, gait problem, joint swelling and neck pain.   Skin:  Negative for rash and wound.   Neurological:  Negative for dizziness, weakness, numbness and headaches.   Psychiatric/Behavioral:  Positive for decreased concentration. Negative for behavioral problems and dysphoric mood.      Objective:   /85 (BP Location: Left arm, Patient Position: Sitting, BP Method: Large (Automatic))   Pulse 97   Temp 98.6 °F (37 °C) (Oral)   Resp 18   Ht 5' 3" (1.6 m)   Wt 96.6 kg (212 lb 14.4 oz)   LMP 03/05/2023   SpO2 98%   BMI 37.71 kg/m²     Physical Exam  Vitals and nursing note reviewed.   Constitutional:       General: She is not in acute distress.     Appearance: Normal appearance. She is obese. She is not ill-appearing or toxic-appearing.   HENT:      Head: Normocephalic and atraumatic.      Mouth/Throat:      Mouth: Mucous membranes are moist.      Pharynx: Oropharynx is clear.   Eyes:      Conjunctiva/sclera: Conjunctivae normal.   Cardiovascular:      Rate and Rhythm: Normal rate and regular rhythm.      Heart sounds: Normal heart sounds. No murmur heard.  Pulmonary:      Effort: Pulmonary effort is normal. No respiratory distress.      Breath sounds: Normal breath sounds.   Abdominal:      General: There is no " distension.      Palpations: Abdomen is soft.      Tenderness: There is no abdominal tenderness.   Musculoskeletal:         General: No deformity.      Cervical back: Neck supple. No tenderness.      Right lower leg: No edema.      Left lower leg: No edema.   Lymphadenopathy:      Cervical: No cervical adenopathy.   Skin:     General: Skin is warm and dry.      Findings: No lesion or rash.   Neurological:      General: No focal deficit present.      Mental Status: She is alert. Mental status is at baseline.   Psychiatric:         Mood and Affect: Mood normal.         Behavior: Behavior normal.         Thought Content: Thought content normal.         Judgment: Judgment normal.     Assessment/Plan:   1. Hepatic steatosis  Assessment & Plan:  - Abdominal U/S from 07/09/18 positive for mild hepatomegaly with underlying steatosis.  - Fatty Liver Disease Recommendations: low fat diet, daily exercise, weight loss, and alcohol avoidance.    Orders:  -     Comprehensive Metabolic Panel; Future; Expected date: 03/08/2023    2. ADHD (attention deficit hyperactivity disorder) evaluation  Assessment & Plan:  - Patient following with Angelito Huitron for ADHD evaluation.  - Once we receive office visit with diagnosis/recommendations, patient will present for an office visit for ADHD management with us.      3. Annual physical exam  -     Lipid Panel; Future; Expected date: 03/08/2023  -     Comprehensive Metabolic Panel; Future; Expected date: 03/08/2023    4. Need for lipid screening  -     Lipid Panel; Future; Expected date: 03/08/2023       Follow up in about 1 month (around 4/8/2023) for Wellness.

## 2023-03-21 ENCOUNTER — OFFICE VISIT (OUTPATIENT)
Dept: URGENT CARE | Facility: CLINIC | Age: 33
End: 2023-03-21

## 2023-03-21 VITALS
RESPIRATION RATE: 22 BRPM | SYSTOLIC BLOOD PRESSURE: 128 MMHG | TEMPERATURE: 99 F | DIASTOLIC BLOOD PRESSURE: 85 MMHG | WEIGHT: 210.13 LBS | OXYGEN SATURATION: 97 % | HEART RATE: 97 BPM | BODY MASS INDEX: 37.23 KG/M2 | HEIGHT: 63 IN

## 2023-03-21 DIAGNOSIS — R11.10 VOMITING, UNSPECIFIED VOMITING TYPE, UNSPECIFIED WHETHER NAUSEA PRESENT: Primary | ICD-10-CM

## 2023-03-21 LAB
CTP QC/QA: YES
CTP QC/QA: YES
FLUAV AG NPH QL: NEGATIVE
FLUBV AG NPH QL: NEGATIVE
SARS-COV-2 RDRP RESP QL NAA+PROBE: NEGATIVE

## 2023-03-21 PROCEDURE — 99213 PR OFFICE/OUTPT VISIT, EST, LEVL III, 20-29 MIN: ICD-10-PCS | Mod: S$PBB,,,

## 2023-03-21 PROCEDURE — 99214 OFFICE O/P EST MOD 30 MIN: CPT | Mod: PBBFAC

## 2023-03-21 PROCEDURE — 99213 OFFICE O/P EST LOW 20 MIN: CPT | Mod: S$PBB,,,

## 2023-03-21 PROCEDURE — 87804 INFLUENZA ASSAY W/OPTIC: CPT | Mod: 59,PBBFAC

## 2023-03-21 PROCEDURE — 87635 SARS-COV-2 COVID-19 AMP PRB: CPT | Mod: PBBFAC

## 2023-03-21 RX ORDER — ONDANSETRON 4 MG/1
4 TABLET, ORALLY DISINTEGRATING ORAL EVERY 8 HOURS PRN
Qty: 10 TABLET | Refills: 0 | Status: SHIPPED | OUTPATIENT
Start: 2023-03-21 | End: 2023-04-28

## 2023-03-21 NOTE — PROGRESS NOTES
"Subjective:       Patient ID: Sosa Ko is a 32 y.o. female.    Vitals:  height is 5' 2.99" (1.6 m) and weight is 95.3 kg (210 lb 1.6 oz). Her temperature is 99 °F (37.2 °C). Her blood pressure is 128/85 and her pulse is 97. Her respiration is 22 (abnormal) and oxygen saturation is 97%.     Chief Complaint: Emesis (X 1day) and Headache    PT states vomiting and headache since last night. Denies abdominal pain or diarrhea. Denies known sick contacts.    Emesis   Associated symptoms include headaches.   Headache   Associated symptoms include nausea and vomiting.     Constitution: Negative.   Neck: neck negative.   Cardiovascular: Negative.    Eyes: Negative.    Respiratory: Negative.     Gastrointestinal:  Positive for nausea and vomiting.   Genitourinary: Negative.    Musculoskeletal: Negative.    Skin: Negative.    Neurological:  Positive for headaches.     Objective:      Physical Exam   Constitutional: She is oriented to person, place, and time.   HENT:   Head: Normocephalic.   Ears:   Right Ear: Tympanic membrane, external ear and ear canal normal.   Left Ear: Tympanic membrane, external ear and ear canal normal.   Nose: Nose normal.   Mouth/Throat: Uvula is midline, oropharynx is clear and moist and mucous membranes are normal. Mucous membranes are moist. Oropharynx is clear.   Eyes: Pupils are equal, round, and reactive to light.   Cardiovascular: Normal rate, regular rhythm, normal heart sounds and normal pulses.   Pulmonary/Chest: Effort normal and breath sounds normal.   Abdominal: Normal appearance. Soft.   Musculoskeletal: Normal range of motion.         General: Normal range of motion.   Neurological: She is alert and oriented to person, place, and time.   Skin: Skin is warm and dry.   Vitals reviewed.      Results for orders placed or performed in visit on 03/21/23   POCT COVID-19 Rapid Screening   Result Value Ref Range    POC Rapid COVID Negative Negative     Acceptable Yes  " The risks and benefits of the bite block have been explained to patient. Patient verbalizes understanding.   POCT Influenza A/B   Result Value Ref Range    Rapid Influenza A Ag Negative Negative    Rapid Influenza B Ag Negative Negative     Acceptable Yes        Assessment:       1. Vomiting, unspecified vomiting type, unspecified whether nausea present            Plan:         Vomiting, unspecified vomiting type, unspecified whether nausea present  -     POCT COVID-19 Rapid Screening  -     POCT Influenza A/B  -     ondansetron (ZOFRAN-ODT) 4 MG TbDL; Take 1 tablet (4 mg total) by mouth every 8 (eight) hours as needed.  Dispense: 10 tablet; Refill: 0      Please drink plenty of fluids.  Please get plenty of rest.    Take over the counter Tylenol (Acetaminophen) and/or Motrin (Ibuprofen) as directed for control of pain and/or fever.  Please follow up with your primary care doctor.     ER precautions given, patient verbalized understanding.     Please see provided patient education for guidance.    Follow up with PCP or return to clinic if symptoms worsen or do not improve.

## 2023-04-28 ENCOUNTER — OFFICE VISIT (OUTPATIENT)
Dept: FAMILY MEDICINE | Facility: CLINIC | Age: 33
End: 2023-04-28

## 2023-04-28 VITALS
HEART RATE: 93 BPM | SYSTOLIC BLOOD PRESSURE: 128 MMHG | HEIGHT: 61 IN | DIASTOLIC BLOOD PRESSURE: 86 MMHG | RESPIRATION RATE: 20 BRPM | WEIGHT: 208.63 LBS | BODY MASS INDEX: 39.39 KG/M2 | OXYGEN SATURATION: 96 % | TEMPERATURE: 98 F

## 2023-04-28 DIAGNOSIS — Z13.39 ADHD (ATTENTION DEFICIT HYPERACTIVITY DISORDER) EVALUATION: Primary | ICD-10-CM

## 2023-04-28 DIAGNOSIS — Z79.899 HIGH RISK MEDICATION USE: ICD-10-CM

## 2023-04-28 LAB
AMPHET UR QL SCN: NEGATIVE
BARBITURATE SCN PRESENT UR: NEGATIVE
BENZODIAZ UR QL SCN: NEGATIVE
CANNABINOIDS UR QL SCN: NEGATIVE
COCAINE UR QL SCN: NEGATIVE
FENTANYL UR QL SCN: NEGATIVE
MDMA UR QL SCN: NEGATIVE
OPIATES UR QL SCN: NEGATIVE
PCP UR QL: NEGATIVE
PH UR: 7.5 [PH] (ref 3–11)
SPECIFIC GRAVITY, URINE AUTO (.000) (OHS): 1.01 (ref 1–1.03)

## 2023-04-28 PROCEDURE — 99214 OFFICE O/P EST MOD 30 MIN: CPT | Mod: ,,, | Performed by: STUDENT IN AN ORGANIZED HEALTH CARE EDUCATION/TRAINING PROGRAM

## 2023-04-28 PROCEDURE — 99214 PR OFFICE/OUTPT VISIT, EST, LEVL IV, 30-39 MIN: ICD-10-PCS | Mod: ,,, | Performed by: STUDENT IN AN ORGANIZED HEALTH CARE EDUCATION/TRAINING PROGRAM

## 2023-04-28 PROCEDURE — 80307 DRUG TEST PRSMV CHEM ANLYZR: CPT | Performed by: STUDENT IN AN ORGANIZED HEALTH CARE EDUCATION/TRAINING PROGRAM

## 2023-04-28 NOTE — PROGRESS NOTES
"Subjective:      Patient ID: Sosa Ko is a 32 y.o.  female.    Chief Complaint: ADHD    ADHD: Patient evaluated by Angelito Huitron on 03/29/23.    Review of Systems   Constitutional:  Negative for activity change, fatigue and fever.   Eyes:  Negative for visual disturbance.   Respiratory:  Negative for apnea, cough and shortness of breath.    Cardiovascular:  Negative for chest pain and leg swelling.   Gastrointestinal:  Negative for abdominal pain, diarrhea and nausea.   Genitourinary:  Negative for dysuria and hematuria.   Musculoskeletal:  Negative for arthralgias, back pain, gait problem, joint swelling, myalgias and neck pain.   Skin:  Negative for rash and wound.   Neurological:  Negative for dizziness, weakness, numbness and headaches.   Psychiatric/Behavioral:  Positive for decreased concentration. Negative for behavioral problems and dysphoric mood.      Objective:   /86 (BP Location: Right arm, Patient Position: Sitting, BP Method: Large (Automatic))   Pulse 93   Temp 98.4 °F (36.9 °C) (Oral)   Resp 20   Ht 5' 1" (1.549 m)   Wt 94.6 kg (208 lb 9.6 oz)   LMP 04/17/2023   SpO2 96%   BMI 39.41 kg/m²     Physical Exam  Vitals and nursing note reviewed.   Constitutional:       General: She is not in acute distress.     Appearance: Normal appearance. She is obese. She is not ill-appearing or toxic-appearing.   HENT:      Head: Normocephalic and atraumatic.   Cardiovascular:      Rate and Rhythm: Normal rate and regular rhythm.      Heart sounds: Normal heart sounds. No murmur heard.  Pulmonary:      Effort: Pulmonary effort is normal. No respiratory distress.      Breath sounds: Normal breath sounds.   Abdominal:      General: There is no distension.      Palpations: Abdomen is soft.      Tenderness: There is no abdominal tenderness.   Musculoskeletal:         General: No deformity.      Right lower leg: No edema.      Left lower leg: No edema.   Skin:     General: Skin is warm and " dry.      Findings: No lesion or rash.   Neurological:      General: No focal deficit present.      Mental Status: She is alert. Mental status is at baseline.   Psychiatric:         Mood and Affect: Mood normal.         Behavior: Behavior normal.         Thought Content: Thought content normal.         Judgment: Judgment normal.     Assessment/Plan:   1. ADHD (attention deficit hyperactivity disorder) evaluation  Assessment & Plan:  - Patient evaluated by Angelito Huitron on 03/29/23 for ADHD. Evaluation record reviewed.  - High-Risk Medication Agreement signed by patient.  - UDS for routine monitoring. If UDS returns negative/consistent, will start Adderall 5mg daily.      2. High risk medication use  -     Drug Screen, Urine       Follow up in about 3 months (around 7/28/2023) for ADHD.

## 2023-04-28 NOTE — ASSESSMENT & PLAN NOTE
- Patient evaluated by Angelito Huitron on 03/29/23 for ADHD. Evaluation record reviewed.  - High-Risk Medication Agreement signed by patient.  - UDS for routine monitoring. If UDS returns negative/consistent, will start Adderall 5mg daily.

## 2023-05-01 DIAGNOSIS — Z13.39 ADHD (ATTENTION DEFICIT HYPERACTIVITY DISORDER) EVALUATION: Primary | ICD-10-CM

## 2023-05-01 RX ORDER — DEXTROAMPHETAMINE SACCHARATE, AMPHETAMINE ASPARTATE, DEXTROAMPHETAMINE SULFATE AND AMPHETAMINE SULFATE 1.25; 1.25; 1.25; 1.25 MG/1; MG/1; MG/1; MG/1
5 TABLET ORAL DAILY
Qty: 30 TABLET | Refills: 0 | Status: SHIPPED | OUTPATIENT
Start: 2023-05-01 | End: 2023-05-09

## 2023-05-09 ENCOUNTER — TELEPHONE (OUTPATIENT)
Dept: FAMILY MEDICINE | Facility: CLINIC | Age: 33
End: 2023-05-09

## 2023-05-09 DIAGNOSIS — Z13.39 ADHD (ATTENTION DEFICIT HYPERACTIVITY DISORDER) EVALUATION: ICD-10-CM

## 2023-05-09 RX ORDER — DEXTROAMPHETAMINE SACCHARATE, AMPHETAMINE ASPARTATE, DEXTROAMPHETAMINE SULFATE AND AMPHETAMINE SULFATE 1.25; 1.25; 1.25; 1.25 MG/1; MG/1; MG/1; MG/1
2.5 TABLET ORAL DAILY
Qty: 30 TABLET | Refills: 0
Start: 2023-05-09 | End: 2023-05-12 | Stop reason: SINTOL

## 2023-05-09 NOTE — TELEPHONE ENCOUNTER
"----- Message from April Stewart sent at 5/9/2023  2:54 PM CDT -----  Regarding: med advice  .Type:  Needs Medical Advice    Who Called: patient  Symptoms (please be specific): negative thoughts and irritable   How long has patient had these symptoms:  05/01  Pharmacy name and phone #:    Would the patient rather a call back or a response via [facility]?  Call back  Best Call Back Number:  [phone]  Additional Information: patient states that after starting dextroamphetamine-amphetamine (ADDERALL) 5 mg Tab she has noticed that she is very irritable, "snapping on others", and negative thoughts are stuck in her head. Please advise.       "

## 2023-05-09 NOTE — TELEPHONE ENCOUNTER
Patient states she will try to decrease the Adderall in half and contact the clinic next week and let us know how she is doing.

## 2023-05-12 ENCOUNTER — TELEPHONE (OUTPATIENT)
Dept: FAMILY MEDICINE | Facility: CLINIC | Age: 33
End: 2023-05-12

## 2023-05-12 DIAGNOSIS — Z13.39 ADHD (ATTENTION DEFICIT HYPERACTIVITY DISORDER) EVALUATION: Primary | ICD-10-CM

## 2023-05-12 RX ORDER — BUPROPION HYDROCHLORIDE 150 MG/1
150 TABLET ORAL DAILY
Qty: 30 TABLET | Refills: 0 | Status: SHIPPED | OUTPATIENT
Start: 2023-05-12 | End: 2023-07-01 | Stop reason: SDUPTHER

## 2023-05-12 NOTE — TELEPHONE ENCOUNTER
Called and spoke with patient. Will D/C Adderall. Patient denies any history of seizure disorder or eating disorder. Will start Wellbutrin 150mg daily.

## 2023-05-12 NOTE — TELEPHONE ENCOUNTER
----- Message from April Stewart sent at 5/12/2023  8:52 AM CDT -----  Regarding: med advice  .Type:  Needs Medical Advice    Who Called: patient  Symptoms (please be specific): head is spinning and feels foggy   How long has patient had these symptoms:  since beginning meds  Pharmacy name and phone #:    Would the patient rather a call back or a response via [facility]? Call back  Best Call Back Number: [phone]  Additional Information:  patient said she was told to adjust the dosage from a conversation earlier this week about how her adderral was making her feel. She says things have gotten worse. Please advise.

## 2023-06-26 ENCOUNTER — PATIENT MESSAGE (OUTPATIENT)
Dept: ADMINISTRATIVE | Facility: HOSPITAL | Age: 33
End: 2023-06-26

## 2023-06-29 ENCOUNTER — HOSPITAL ENCOUNTER (EMERGENCY)
Facility: HOSPITAL | Age: 33
Discharge: HOME | End: 2023-06-29
Attending: EMERGENCY MEDICINE

## 2023-06-29 VITALS
TEMPERATURE: 98 F | SYSTOLIC BLOOD PRESSURE: 157 MMHG | HEART RATE: 78 BPM | WEIGHT: 205 LBS | HEIGHT: 64 IN | DIASTOLIC BLOOD PRESSURE: 93 MMHG | RESPIRATION RATE: 18 BRPM | OXYGEN SATURATION: 98 % | BODY MASS INDEX: 35 KG/M2

## 2023-06-29 DIAGNOSIS — M79.661 PAIN OF RIGHT LOWER LEG: Primary | ICD-10-CM
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
## 2023-06-29 PROCEDURE — 99284 EMERGENCY DEPT VISIT MOD MDM: CPT

## 2023-06-29 PROCEDURE — 25000003 PHARM REV CODE 250: Performed by: NURSE PRACTITIONER

## 2023-06-29 RX ORDER — PREDNISONE 50 MG/1
50 TABLET ORAL
COMMUNITY
Start: 2023-06-26 | End: 2023-07-01

## 2023-06-29 RX ORDER — HYDROCODONE BITARTRATE AND ACETAMINOPHEN 5; 325 MG/1; MG/1
1 TABLET ORAL EVERY 6 HOURS PRN
Qty: 12 TABLET | Refills: 0 | Status: SHIPPED | OUTPATIENT
Start: 2023-06-29 | End: 2023-08-02

## 2023-06-29 RX ORDER — HYDROCODONE BITARTRATE AND ACETAMINOPHEN 5; 325 MG/1; MG/1
1 TABLET ORAL
Status: COMPLETED | OUTPATIENT
Start: 2023-06-29 | End: 2023-06-29

## 2023-06-29 RX ADMIN — HYDROCODONE BITARTRATE AND ACETAMINOPHEN 1 TABLET: 5; 325 TABLET ORAL at 03:06

## 2023-06-29 NOTE — ED PROVIDER NOTES
Encounter Date: 2023       History     Chief Complaint   Patient presents with    Fall     R leg pain after slipping in shower last night      Patient states that last night she slipped and fell in the shower. Denies any LOC. States right lower leg pain. States that pain worsens with movement and ambulation.     The history is provided by the patient and a friend.   Fall  The accident occurred yesterday. The fall occurred while standing. Distance fallen: Ground Level. She landed on A hard floor. Pain location: Right lower leg. The pain is at a severity of 8/10. She was Ambulatory at the scene. Pertinent negatives include no neck pain, no back pain, no fever and no numbness.   Review of patient's allergies indicates:   Allergen Reactions    Rubbing alcohol (ethanol) [ethanol (ethyl alcohol)] Hives     Past Medical History:   Diagnosis Date    ADHD (attention deficit hyperactivity disorder)     Arthritis      Past Surgical History:   Procedure Laterality Date     SECTION      FRACTURE SURGERY      TUBAL LIGATION       Family History   Problem Relation Age of Onset    Arthritis Mother     Psoriasis Mother     Cancer Father      Social History     Tobacco Use    Smoking status: Former     Types: Cigarettes    Smokeless tobacco: Never   Substance Use Topics    Alcohol use: Yes     Comment: Occassional    Drug use: Never     Review of Systems   Constitutional: Negative.  Negative for chills and fever.   HENT: Negative.     Eyes: Negative.    Respiratory: Negative.     Cardiovascular: Negative.    Gastrointestinal: Negative.    Endocrine: Negative.    Genitourinary: Negative.    Musculoskeletal: Negative.  Negative for back pain and neck pain.        Right lower leg pain.   Skin: Negative.  Negative for wound.   Allergic/Immunologic: Negative.    Neurological: Negative.  Negative for weakness and numbness.   Hematological: Negative.    Psychiatric/Behavioral: Negative.     All other systems reviewed and are  negative.    Physical Exam     Initial Vitals [06/29/23 1427]   BP Pulse Resp Temp SpO2   (!) 157/93 78 20 98.2 °F (36.8 °C) 98 %      MAP       --         Physical Exam    Nursing note and vitals reviewed.  Constitutional: She appears well-developed and well-nourished. No distress.   HENT:   Head: Normocephalic and atraumatic.   Mouth/Throat: Oropharynx is clear and moist.   Eyes: Conjunctivae and EOM are normal. Pupils are equal, round, and reactive to light.   Neck: Neck supple.   Normal range of motion.  Cardiovascular:  Normal rate, regular rhythm, normal heart sounds and intact distal pulses.           Pulses:       Dorsalis pedis pulses are 2+ on the right side and 2+ on the left side.   Pulmonary/Chest: Breath sounds normal. No respiratory distress. She has no wheezes.   Abdominal: Abdomen is soft. She exhibits no distension. There is no abdominal tenderness.   Musculoskeletal:         General: No edema. Normal range of motion.      Cervical back: Normal range of motion and neck supple.      Right lower leg: Tenderness present. No swelling or deformity.      Left lower leg: Normal.        Legs:      Neurological: She is alert and oriented to person, place, and time. She has normal strength. GCS score is 15. GCS eye subscore is 4. GCS verbal subscore is 5. GCS motor subscore is 6.   Skin: Skin is warm and dry. No rash noted.   Psychiatric: She has a normal mood and affect. Thought content normal.       ED Course   Procedures  Labs Reviewed - No data to display       Imaging Results              X-Ray Tibia Fibula 2 View Left (Preliminary result)  Result time 06/29/23 15:13:57   Procedure changed from X-Ray Tibia Fibula 2 View Right     Wet Read by RUPERT Flanagan (06/29/23 15:13:57, [facility] - Emergency Dept, Emergency Medicine)    No acute changes.                                     Medications   HYDROcodone-acetaminophen 5-325 mg per tablet 1 tablet (1 tablet Oral Given 6/29/23  1500)     Medical Decision Making:   Initial Assessment:   Patient states that last night she slipped and fell in the shower. Denies any LOC. States right lower leg pain. States that pain worsens with movement and ambulation.     The history is provided by the patient and a friend.   Fall  The accident occurred yesterday. The fall occurred while standing. Distance fallen: Ground Level. She landed on A hard floor. Pain location: Right lower leg. The pain is at a severity of 8/10. She was Ambulatory at the scene. Pertinent negatives include no neck pain, no back pain, no fever and no numbness.   Patient is awake, alert, neurovascularly intact, and ambulatory in the ED.   Differential Diagnosis:   Fracture, Sprain, Strain  Clinical Tests:   Radiological Study: Ordered and Reviewed  ED Management:  Patient's x-ray does not show any acute findings. Patient was given Narco PO for pain in the ED. Will discharge patient with pain control.                         Clinical Impression:   Final diagnoses:  [W19.XXXA] Fall               RUPERT Flanagan  06/29/23 1538

## 2023-06-29 NOTE — Clinical Note
"Sosa Vera" Maikel was seen and treated in our emergency department on 6/29/2023.  She may return to work on 07/01/2023.       If you have any questions or concerns, please don't hesitate to call.      RUPERT Flanagan"

## 2023-07-01 DIAGNOSIS — Z13.39 ADHD (ATTENTION DEFICIT HYPERACTIVITY DISORDER) EVALUATION: Primary | ICD-10-CM

## 2023-07-05 RX ORDER — BUPROPION HYDROCHLORIDE 150 MG/1
150 TABLET ORAL DAILY
Qty: 30 TABLET | Refills: 0 | Status: SHIPPED | OUTPATIENT
Start: 2023-07-05 | End: 2023-08-02

## 2023-08-02 ENCOUNTER — OFFICE VISIT (OUTPATIENT)
Dept: FAMILY MEDICINE | Facility: CLINIC | Age: 33
End: 2023-08-02

## 2023-08-02 VITALS
HEIGHT: 64 IN | RESPIRATION RATE: 18 BRPM | OXYGEN SATURATION: 97 % | DIASTOLIC BLOOD PRESSURE: 85 MMHG | SYSTOLIC BLOOD PRESSURE: 123 MMHG | HEART RATE: 90 BPM | TEMPERATURE: 99 F | BODY MASS INDEX: 34.35 KG/M2 | WEIGHT: 201.19 LBS

## 2023-08-02 DIAGNOSIS — Z13.39 ADHD (ATTENTION DEFICIT HYPERACTIVITY DISORDER) EVALUATION: Primary | ICD-10-CM

## 2023-08-02 PROCEDURE — 99214 PR OFFICE/OUTPT VISIT, EST, LEVL IV, 30-39 MIN: ICD-10-PCS | Mod: ,,, | Performed by: STUDENT IN AN ORGANIZED HEALTH CARE EDUCATION/TRAINING PROGRAM

## 2023-08-02 PROCEDURE — 99214 OFFICE O/P EST MOD 30 MIN: CPT | Mod: ,,, | Performed by: STUDENT IN AN ORGANIZED HEALTH CARE EDUCATION/TRAINING PROGRAM

## 2023-08-02 RX ORDER — BUPROPION HYDROCHLORIDE 300 MG/1
300 TABLET ORAL DAILY
Qty: 90 TABLET | Refills: 0 | Status: SHIPPED | OUTPATIENT
Start: 2023-08-02 | End: 2024-08-01

## 2023-08-02 NOTE — ASSESSMENT & PLAN NOTE
- Patient evaluated by Angelito Huitron on 03/29/23 for ADHD.   - Increasing Wellbutrin-XL from 150mg to 300mg daily.  
continue PEG and metamucil

## 2023-08-02 NOTE — PROGRESS NOTES
"Subjective:      Patient ID: Sosa Ko is a 32 y.o.  female.    Chief Complaint: ADHD    ADHD: Patient inquiring about an increase in Wellbutrin dose.    Review of Systems   Constitutional:  Negative for activity change, fatigue and fever.   Eyes:  Negative for visual disturbance.   Respiratory:  Negative for apnea, cough and shortness of breath.    Cardiovascular:  Negative for chest pain and leg swelling.   Gastrointestinal:  Negative for abdominal pain, diarrhea and nausea.   Genitourinary:  Negative for dysuria and hematuria.   Musculoskeletal:  Negative for arthralgias, back pain, gait problem, joint swelling, myalgias and neck pain.   Skin:  Negative for rash and wound.   Neurological:  Negative for dizziness, weakness, numbness and headaches.   Psychiatric/Behavioral:  Positive for decreased concentration. Negative for behavioral problems and dysphoric mood.        Objective:   /85 (BP Location: Right arm, Patient Position: Sitting, BP Method: Large (Automatic))   Pulse 90   Temp 98.5 °F (36.9 °C) (Temporal)   Resp 18   Ht 5' 4" (1.626 m)   Wt 91.3 kg (201 lb 3.2 oz)   SpO2 97%   BMI 34.54 kg/m²     Physical Exam  Vitals and nursing note reviewed.   Constitutional:       General: She is not in acute distress.     Appearance: Normal appearance. She is obese. She is not ill-appearing or toxic-appearing.   HENT:      Head: Normocephalic and atraumatic.   Eyes:      Conjunctiva/sclera: Conjunctivae normal.   Cardiovascular:      Rate and Rhythm: Normal rate and regular rhythm.      Heart sounds: Normal heart sounds. No murmur heard.  Pulmonary:      Effort: Pulmonary effort is normal. No respiratory distress.      Breath sounds: Normal breath sounds.   Abdominal:      General: There is no distension.      Palpations: Abdomen is soft.      Tenderness: There is no abdominal tenderness.   Musculoskeletal:         General: No deformity.      Right lower leg: No edema.      Left lower leg: " No edema.   Skin:     General: Skin is warm and dry.      Findings: No lesion or rash.   Neurological:      General: No focal deficit present.      Mental Status: She is alert. Mental status is at baseline.      Motor: No weakness.      Coordination: Coordination normal.   Psychiatric:         Mood and Affect: Mood normal.         Behavior: Behavior normal.         Thought Content: Thought content normal.         Judgment: Judgment normal.       Assessment/Plan:   1. ADHD (attention deficit hyperactivity disorder) evaluation  Assessment & Plan:  - Patient evaluated by Angelito Huitron on 03/29/23 for ADHD.   - Increasing Wellbutrin-XL from 150mg to 300mg daily.    Orders:  -     buPROPion (WELLBUTRIN XL) 300 MG 24 hr tablet; Take 1 tablet (300 mg total) by mouth once daily.  Dispense: 90 tablet; Refill: 0

## 2023-10-17 ENCOUNTER — PATIENT MESSAGE (OUTPATIENT)
Dept: ADMINISTRATIVE | Facility: HOSPITAL | Age: 33
End: 2023-10-17

## 2024-02-05 ENCOUNTER — PATIENT MESSAGE (OUTPATIENT)
Dept: ADMINISTRATIVE | Facility: HOSPITAL | Age: 34
End: 2024-02-05

## 2024-02-13 ENCOUNTER — HOSPITAL ENCOUNTER (EMERGENCY)
Facility: HOSPITAL | Age: 34
Discharge: HOME | End: 2024-02-13
Attending: INTERNAL MEDICINE

## 2024-02-13 VITALS
WEIGHT: 210.13 LBS | DIASTOLIC BLOOD PRESSURE: 74 MMHG | BODY MASS INDEX: 35.87 KG/M2 | TEMPERATURE: 98 F | OXYGEN SATURATION: 96 % | HEIGHT: 64 IN | RESPIRATION RATE: 20 BRPM | SYSTOLIC BLOOD PRESSURE: 109 MMHG | HEART RATE: 104 BPM

## 2024-02-13 DIAGNOSIS — T14.8XXA MUSCLE STRAIN: ICD-10-CM
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
## 2024-02-13 DIAGNOSIS — S09.90XA INJURY OF HEAD, INITIAL ENCOUNTER: Primary | ICD-10-CM

## 2024-02-13 PROCEDURE — 99284 EMERGENCY DEPT VISIT MOD MDM: CPT | Mod: 25

## 2024-02-13 PROCEDURE — 25000003 PHARM REV CODE 250: Performed by: NURSE PRACTITIONER

## 2024-02-13 RX ORDER — METHOCARBAMOL 500 MG/1
500 TABLET, FILM COATED ORAL
Status: COMPLETED | OUTPATIENT
Start: 2024-02-13 | End: 2024-02-13

## 2024-02-13 RX ORDER — BACLOFEN 10 MG/1
10 TABLET ORAL 2 TIMES DAILY PRN
Qty: 16 TABLET | Refills: 0 | Status: SHIPPED | OUTPATIENT
Start: 2024-02-13

## 2024-02-13 RX ADMIN — METHOCARBAMOL 500 MG: 500 TABLET ORAL at 09:02

## 2024-02-14 NOTE — DISCHARGE INSTRUCTIONS
Head injury precautions for next 12 hours. No alcohol, no NSAIDs in that time frame.  Follow up with your PCP in next 5-7 days for evaluation.   Return to the [facility] ED immediately for change in mentation, bleeding from ears/nose, or loss of bowel or bladder control.

## 2024-02-14 NOTE — ED PROVIDER NOTES
"Encounter Date: 2024       History     Chief Complaint   Patient presents with    Head Injury     FELL BACKWARD DOWN 3 STEPS AND HIT BACK OF HEAD ON GROUND PTA.  ? LOC.  +ETOH.  A&O X 4.       Pt is a 33 y.o. female who presents to the [facility] ED complaining of posterior headache after she fell and struck her head while climbing stairs into her RV. Admits to alcohol use prior to arrival. Uncertain of LOC, reports family "told me that I passed out." Denies chest pain, SOB, weakness, dizziness, neck pain, abdominal pain, or loss of bowel or bladder control. Hx of ADHD, arthritis. Injury occurred immediately prior to arrival. Pt verbalizing frustration that her daughter cannot come into her exam room with her. Mother currently present at the bedside. Reports pt's daughter has been picked up by another family member.      Review of patient's allergies indicates:   Allergen Reactions    Rubbing alcohol (ethanol) [ethanol (ethyl alcohol)] Hives     Past Medical History:   Diagnosis Date    ADHD (attention deficit hyperactivity disorder)     Arthritis      Past Surgical History:   Procedure Laterality Date     SECTION      FRACTURE SURGERY      TUBAL LIGATION       Family History   Problem Relation Age of Onset    Arthritis Mother     Psoriasis Mother     Cancer Father      Social History     Tobacco Use    Smoking status: Former     Types: Cigarettes    Smokeless tobacco: Never   Substance Use Topics    Alcohol use: Yes     Comment: Occassional    Drug use: Never     Review of Systems   Constitutional:  Negative for chills, diaphoresis, fatigue and fever.   HENT:  Negative for facial swelling, rhinorrhea, sinus pressure, sinus pain, sore throat and trouble swallowing.    Respiratory:  Negative for cough, chest tightness, shortness of breath and wheezing.    Cardiovascular:  Negative for chest pain, palpitations and leg swelling.   Gastrointestinal:  Negative for abdominal pain, diarrhea, nausea and vomiting. "   Genitourinary:  Negative for dysuria, flank pain, frequency, hematuria and urgency.   Musculoskeletal:  Positive for myalgias. Negative for arthralgias, back pain and joint swelling.   Skin:  Negative for color change and rash.   Neurological:  Positive for headaches. Negative for dizziness, syncope, weakness and light-headedness.   Hematological:  Does not bruise/bleed easily.   All other systems reviewed and are negative.      Physical Exam     Initial Vitals [02/13/24 1748]   BP Pulse Resp Temp SpO2   (!) 138/96 68 16 97.9 °F (36.6 °C) 97 %      MAP       --         Physical Exam    Nursing note and vitals reviewed.  Constitutional: She appears well-developed and well-nourished.   HENT:   Head: Normocephalic and atraumatic.   Nose: Nose normal.   Mouth/Throat: Oropharynx is clear and moist.   Eyes: Conjunctivae and EOM are normal. Pupils are equal, round, and reactive to light.   Neck: Neck supple.       Normal range of motion.  Cardiovascular:  Normal rate, regular rhythm, normal heart sounds and intact distal pulses.           Pulmonary/Chest: Effort normal and breath sounds normal. No respiratory distress. She has no wheezes. She has no rhonchi. She has no rales. She exhibits no tenderness.   Abdominal: Abdomen is soft and flat. Bowel sounds are normal. She exhibits no distension. There is no abdominal tenderness. There is no rebound, no guarding, no tenderness at McBurney's point and negative Burden's sign. negative psoas sign  Musculoskeletal:         General: Normal range of motion.      Cervical back: Normal range of motion and neck supple. No rigidity. Muscular tenderness present. No spinous process tenderness. Normal range of motion.     Neurological: She is alert and oriented to person, place, and time. She has normal strength and normal reflexes.   Skin: Skin is warm and dry. Capillary refill takes less than 2 seconds.   Psychiatric: She has a normal mood and affect. Her speech is normal. Judgment  and thought content normal. She is agitated. She expresses no homicidal and no suicidal ideation. She expresses no suicidal plans and no homicidal plans.         ED Course   Procedures  Labs Reviewed - No data to display       Imaging Results              CT Cervical Spine Without Contrast (Preliminary result)  Result time 02/13/24 20:54:56      Preliminary result by Zbigniew Cano Jr., MD (02/13/24 20:54:56)                   Narrative:    START OF REPORT:  Technique: CT of the cervical spine was performed without intravenous contrast with axial as well as sagittal and coronal images.    Comparison: None.    Dosage Information: Automated exposure control was utilized.    Clinical history: Head injury.    Findings:  Spine:  Spinal canal: The spinal canal appears unremarkable.  Mineralization: Within normal limits.  Rotation: No significant rotation is seen.  Scoliosis: No significant scoliosis is seen.  Vertebral Fusion: No vertebral fusion is identified.  Listhesis: No significant listhesis is identified.  Lordosis: Mild reversal of the cervical lordosis is seen. This may be positional or reflect an element of myospasm.  Intervertebral disc spaces: The intervertebral discs are preserved throughout.  Osteophytes: No significant osteophytes are seen in the cervical spine.  Endplate Sclerosis: No significant endplate sclerosis is seen.  Uncovertebral degenerative changes: No significant uncovertebral degenerative changes are seen.  Facet degenerative changes: No significant facet degenerative changes are seen.  Fractures: No acute cervical spine fracture dislocation or subluxation is seen.  Orthopedic Hardware: None.    Miscellaneous:  Mastoid air cells: The visualized mastoid air cells appear clear.  Soft Tissues: Unremarkable.      Impression:  1. No acute cervical spine fracture dislocation or subluxation is seen.  2. Details as noted above.                                         CT Head Without Contrast  (Preliminary result)  Result time 02/13/24 19:12:12      Preliminary result by Zechariah Kim MD (02/13/24 19:12:12)                   Narrative:    START OF REPORT:  Technique: CT of the head was performed without intravenous contrast with axial as well as coronal and sagittal images.    Dosage Information: Automated Exposure Control was utilized 1310.41 mGy.cm.    Clinical history: Head Injury.    Findings:  Hemorrhage: No acute intracranial hemorrhage is seen.  CSF spaces: The ventricles sulci and basal cisterns are within normal limits.  Brain parenchyma: Unremarkable with preservation of the grey white junction throughout.  Cerebellum: Unremarkable.  Sella and skull base: The sella appears to be within normal limits for age.  Cerebellopontine angles: Within normal limits.  Intracranial calcifications: Incidental note is made of bilateral choroid plexus calcification. Incidental note is made of some pineal region calcification.  Calvarium: No acute linear or depressed skull fracture is seen.    Maxillofacial Structures:  Paranasal sinuses: The visualized paranasal sinuses appear clear with no mucoperiosteal thickening or air fluid levels identified.  Orbits: The orbits appear unremarkable.  Zygomatic arches: The zygomatic arches are intact and unremarkable.  Temporal bones and mastoids: The temporal bones and mastoids appear unremarkable.  TMJ: The mandibular condyles appear normally placed with respect to the mandibular fossa.      Impression:  1. No acute intracranial process identified. Details as above.                                         CT Cervical Spine Without Contrast (Preliminary result)  Result time 02/13/24 19:01:41      Preliminary result by Zechariah Kim MD (02/13/24 19:01:41)                   Narrative:    START OF REPORT:  Technique: CT of the cervical spine was performed without intravenous contrast with axial as well as sagittal and coronal images.    Comparison: None.    Dosage  "Information: Automated exposure control was utilized.    Clinical history: Chief complaints Head Injury ; pt fall.    Findings: Please note there is pronounced motion artifact on nearly all of the cervical spine images such that the study is nondiagnostic for cervical spine fractures. Recommend repeat motion free study 4 cervical spine evaluation.  Lung apices: The visualized lung apices appear unremarkable.      Impression:  1. Please note there is pronounced motion artifact on nearly all of the cervical spine images such that the study is nondiagnostic for cervical spine fractures. Recommend repeat motion free study 4 cervical spine evaluation.  2. Details as noted above.                                         Medications   methocarbamoL tablet 500 mg (has no administration in time range)     Medical Decision Making  Differential:  Subdural hematoma  Subdural hemorrhage  Accidental fall      Amount and/or Complexity of Data Reviewed  Radiology: ordered.               ED Course as of 02/13/24 2055 Tue Feb 13, 2024 1922 CT cervical spine non diagnostic per Radiologist due to motion artifact. I have discussed result with Dr. Hinds, ED attending, who recommends repeat imaging due to nature of pt's injury. Discussed plan with pt who reports being "startled" during her image which made her move. Pt assures me that she will hold still for repeat imaging. New orders provided. [JA]      ED Course User Index  [JA] Angelito Leggett Jr., FNP                           Clinical Impression:  Final diagnoses:  [S09.90XA] Injury of head, initial encounter (Primary)  [W19.XXXA] Accidental fall, initial encounter  [T14.8XXA] Muscle strain          ED Disposition Condition    Discharge Stable          ED Prescriptions       Medication Sig Dispense Start Date End Date Auth. Provider    baclofen (LIORESAL) 10 MG tablet Take 1 tablet (10 mg total) by mouth 2 (two) times daily as needed (muscle spasms). 16 tablet 2/13/2024 -- " Angelito Leggett Jr., [facility]          Follow-up Information       Follow up With Specialties Details Why Contact Info    Stefany Chiang, DO Family Medicine In 3 days  [address] [address]  [phone]      [facility] Emergency Dept Emergency Medicine In 3 days As needed, If symptoms worsen [address] [facility] [id number] [phone]             Angelito Leggett Jr., [facility]  02/13/24 2055

## 2024-07-05 ENCOUNTER — HOSPITAL ENCOUNTER (EMERGENCY)
Facility: HOSPITAL | Age: 34
Discharge: HOME | End: 2024-07-05
Attending: EMERGENCY MEDICINE

## 2024-07-05 VITALS
BODY MASS INDEX: 36.82 KG/M2 | HEART RATE: 105 BPM | WEIGHT: 195 LBS | RESPIRATION RATE: 18 BRPM | TEMPERATURE: 98 F | HEIGHT: 61 IN | SYSTOLIC BLOOD PRESSURE: 101 MMHG | OXYGEN SATURATION: 98 % | DIASTOLIC BLOOD PRESSURE: 84 MMHG

## 2024-07-05 DIAGNOSIS — R52 PAIN: ICD-10-CM
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
## 2024-07-05 DIAGNOSIS — S93.402A SPRAIN OF LEFT ANKLE, UNSPECIFIED LIGAMENT, INITIAL ENCOUNTER: Primary | ICD-10-CM

## 2024-07-05 PROCEDURE — 99283 EMERGENCY DEPT VISIT LOW MDM: CPT | Mod: 25

## 2024-07-05 RX ORDER — HYDROCODONE BITARTRATE AND ACETAMINOPHEN 5; 325 MG/1; MG/1
1 TABLET ORAL EVERY 6 HOURS PRN
Qty: 12 TABLET | Refills: 0 | Status: SHIPPED | OUTPATIENT
Start: 2024-07-05

## 2024-07-05 NOTE — Clinical Note
"Sosa Dupreeen" Maikel was seen and treated in our emergency department on 7/5/2024.  She may return to work on 07/08/2024.       If you have any questions or concerns, please don't hesitate to call.      Adelina Morgan MD"

## 2024-07-06 NOTE — ED PROVIDER NOTES
Encounter Date: 2024       History     Chief Complaint   Patient presents with    Ankle Injury     Patient is a 34 yo F presenting with left ankle pain. Patient rolled it today. No other injury. She has otherwise been at her baseline health. She has some pain in the lateral and underside of the foot in addition to the left lateral         Review of patient's allergies indicates:   Allergen Reactions    Rubbing alcohol (ethanol) [ethanol (ethyl alcohol)] Hives     Past Medical History:   Diagnosis Date    ADHD (attention deficit hyperactivity disorder)     Arthritis      Past Surgical History:   Procedure Laterality Date     SECTION      FRACTURE SURGERY      TUBAL LIGATION       Family History   Problem Relation Name Age of Onset    Arthritis Mother      Psoriasis Mother      Cancer Father       Social History     Tobacco Use    Smoking status: Former     Types: Cigarettes    Smokeless tobacco: Never   Substance Use Topics    Alcohol use: Yes     Comment: Occassional    Drug use: Never     Review of Systems   Constitutional:  Negative for fever.   HENT:  Negative for sore throat.    Respiratory:  Negative for shortness of breath.    Cardiovascular:  Negative for chest pain.   Gastrointestinal:  Negative for nausea.   Genitourinary:  Negative for dysuria.   Musculoskeletal:  Positive for joint swelling. Negative for back pain.   Skin:  Negative for rash.   Neurological:  Negative for weakness.   Hematological:  Does not bruise/bleed easily.       Physical Exam     Initial Vitals [24 2100]   BP Pulse Resp Temp SpO2   101/84 105 18 98.1 °F (36.7 °C) 98 %      MAP       --         Physical Exam    Nursing note and vitals reviewed.  Constitutional: She appears well-developed and well-nourished. No distress.   HENT:   Head: Normocephalic and atraumatic.   Cardiovascular:  Normal rate and regular rhythm.           No murmur heard.  Pulmonary/Chest: No respiratory distress.   Musculoskeletal:          General: Tenderness and edema present.      Comments: LLE: TTP of the lateral aspect of the ankle with overlying ecchymosis. Neurovascularly intact.      Neurological: She is alert and oriented to person, place, and time.   Skin: Skin is warm and dry.   Psychiatric: She has a normal mood and affect. Thought content normal.         ED Course   Procedures  Labs Reviewed - No data to display       Imaging Results              X-Ray Foot Complete Right (Final result)  Result time 07/06/24 09:54:40      Final result by Mike Quiles MD (07/06/24 09:54:40)                   Impression:      1. No acute displaced fracture or dislocation identified.      Electronically signed by: Mike Quiles MD  Date:    07/06/2024  Time:    09:54               Narrative:    EXAMINATION:  XR FOOT COMPLETE 3 VIEW RIGHT    CLINICAL HISTORY:  Fall, left foot pain.    TECHNIQUE:  Three views of the right foot.    COMPARISON:  03/29/2018    FINDINGS:  No acute displaced fracture, subluxation, or dislocation is identified.  There is calcaneal spurring.  No radiopaque foreign bodies identified.  No significant soft tissue swelling is identified.                        Wet Read by Adelina Morgan MD (07/05/24 22:44:32, [facility] Emergency [facility], Emergency Medicine)    No acute injury                                     X-Ray Ankle Complete Left (Final result)  Result time 07/06/24 09:53:32      Final result by Mike Quiles MD (07/06/24 09:53:32)                   Impression:      1. No acute displaced fracture or dislocation.      Electronically signed by: Mike Quiles MD  Date:    07/06/2024  Time:    09:53               Narrative:    EXAMINATION:  XR ANKLE COMPLETE 3 VIEW LEFT    CLINICAL HISTORY:  Fall, left ankle pain.    TECHNIQUE:  Three views of the left ankle.    COMPARISON:  11/26/2017    FINDINGS:  No acute displaced fracture, subluxation, or dislocation is identified.  Ankle mortise is preserved.  There  is calcaneal spurring.  There is small spur of the anterior tibial plafond.  No radiopaque foreign bodies are identified.  No significant soft tissue swelling is identified.                        Wet Read by Adelina Morgan MD (07/05/24 22:44:43, [facility] - Emergency Dept, Emergency Medicine)    No acute injury                                     Medications - No data to display  Medical Decision Making  Differentials considered but not limited to fracture, sprain, contusion.    Patient was provided with a walking boot. She has crutches at home. Results were reviewed with patient. Questions were answered along with a discussion of signs and symptoms to return for. Patient comfortable with plan of discharge.      Problems Addressed:  Fall: acute illness or injury  Pain: acute illness or injury  Sprain of left ankle, unspecified ligament, initial encounter: acute illness or injury    Amount and/or Complexity of Data Reviewed  Radiology: ordered and independent interpretation performed.    Risk  Prescription drug management.                                      Clinical Impression:  Final diagnoses:  [R52] Pain  [W19.XXXA] Fall  [S93.402A] Sprain of left ankle, unspecified ligament, initial encounter (Primary)          ED Disposition Condition    Discharge Stable          ED Prescriptions       Medication Sig Dispense Start Date End Date Auth. Provider    HYDROcodone-acetaminophen (NORCO) 5-325 mg per tablet Take 1 tablet by mouth every 6 (six) hours as needed for Pain. 12 tablet 7/5/2024 -- Adelina Morgan MD          Follow-up Information       Follow up With Specialties Details Why Contact Info    Clinics, [facility]   A referral was sent to [facility] for orthopedics. They should call to schedule your follow up but we recommned you call at your earliest convience to check on the status., If symptoms worsen, return to the ED [address] [facility] [id number] [phone]               Eddie  Adelina VIEYRA MD  07/07/24 0320

## 2025-04-15 ENCOUNTER — PATIENT MESSAGE (OUTPATIENT)
Facility: CLINIC | Age: 35
End: 2025-04-15